# Patient Record
Sex: MALE | Race: WHITE | Employment: FULL TIME | URBAN - METROPOLITAN AREA
[De-identification: names, ages, dates, MRNs, and addresses within clinical notes are randomized per-mention and may not be internally consistent; named-entity substitution may affect disease eponyms.]

---

## 2018-01-21 ENCOUNTER — APPOINTMENT (OUTPATIENT)
Dept: GENERAL RADIOLOGY | Facility: CLINIC | Age: 48
End: 2018-01-21
Attending: EMERGENCY MEDICINE
Payer: COMMERCIAL

## 2018-01-21 ENCOUNTER — HOSPITAL ENCOUNTER (EMERGENCY)
Facility: CLINIC | Age: 48
Discharge: HOME | End: 2018-01-21
Attending: EMERGENCY MEDICINE | Admitting: EMERGENCY MEDICINE
Payer: COMMERCIAL

## 2018-01-21 VITALS
RESPIRATION RATE: 16 BRPM | TEMPERATURE: 98.8 F | OXYGEN SATURATION: 97 % | SYSTOLIC BLOOD PRESSURE: 132 MMHG | DIASTOLIC BLOOD PRESSURE: 84 MMHG | HEART RATE: 59 BPM

## 2018-01-21 DIAGNOSIS — J10.1 INFLUENZA A: ICD-10-CM

## 2018-01-21 LAB
FLUAV+FLUBV AG SPEC QL: NEGATIVE
FLUAV+FLUBV AG SPEC QL: POSITIVE
SPECIMEN SOURCE: ABNORMAL

## 2018-01-21 PROCEDURE — 99284 EMERGENCY DEPT VISIT MOD MDM: CPT | Mod: 25

## 2018-01-21 PROCEDURE — 71046 X-RAY EXAM CHEST 2 VIEWS: CPT

## 2018-01-21 PROCEDURE — 87804 INFLUENZA ASSAY W/OPTIC: CPT | Performed by: EMERGENCY MEDICINE

## 2018-01-21 RX ORDER — BENZONATATE 100 MG/1
100 CAPSULE ORAL 3 TIMES DAILY PRN
Qty: 20 CAPSULE | Refills: 0 | Status: SHIPPED | OUTPATIENT
Start: 2018-01-21

## 2018-01-21 ASSESSMENT — ENCOUNTER SYMPTOMS
DIARRHEA: 0
FEVER: 1
VOMITING: 0
COUGH: 1
HEADACHES: 1

## 2018-01-21 NOTE — ED NOTES
Patient was ill with flu symptoms last week, better yesterday but today woke up and started coughing. He feels the same as when he had a previous pneumonia. Chest hurts with coughing.

## 2018-01-21 NOTE — ED AVS SNAPSHOT
[facility] Emergency Department    [address] [facility] [address]    Phone:  [phone]    Fax:  [phone]                                       Jonny Spencer III   MRN: [id number]    Department:  [facility] Emergency Department   Date of Visit:  1/21/2018           Patient Information     Date Of Birth          1970        Your diagnoses for this visit were:     Influenza A        You were seen by Wellington Brown MD.      Follow-up Information     Follow up with Luis Soares MD.    Specialty:  Internal Medicine    Contact information:    [facility]  [address] [place] [address]  [phone]          Follow up with [facility] Emergency Department.    Specialty:  EMERGENCY MEDICINE    Why:  If symptoms worsen    Contact information:    [address] [place] [address]  [phone]        Discharge Instructions       Discharge Instructions  Influenza    You were diagnosed today with influenza or influenza like illness.  Influenza is a respiratory (breathing) illness caused by influenza A or B viruses.  Influenza causes five primary symptoms: fever, headache, muscle aches/fatigue/malaise, sore throat and cough.  These symptoms start one to four days after you have been around a person with this illness. Influenza is spread through sneezing and coughing and can live on surfaces for several days.  It is usually contagious for 5 days but in some cases up to 10 days and often affects several family members. If you have a family member who is less than 2 years old, older than 65 years old, pregnant or has a serious medical condition, they should be seen right away by a provider to decide if they should take preventative medications. Although influenza will make you feel very ill, most patients don t require any specific treatment. An antiviral medication might be prescribed for certain groups of  patients (older patients, younger patients, and those with certain chronic medical problems).    Generally, every Emergency Department visit should have a follow-up clinic visit with either a primary or a specialty clinic/provider. Please follow-up as instructed by your emergency provider today.    Return to the Emergency Department if:    You have trouble breathing.    You develop pain in your chest.    You have signs of being dehydrated, such as dizziness or unable to urinate (pee) at least three times daily.    You are confused or severely weak.    You cannot stop vomiting (throwing up) or you cannot drink enough fluids.    In children, you should seek help if the child has any of the above or if child:    Has blue or purplish skin color.    Is so irritable that he or she does not want to be held.    Does not have tears when crying (in infants) or does not urinate at least three times daily.    Does not wake up easily.    What can I do to help myself?    Rest.    Fluids -- Drink hydrating solutions such as Gatorade  or Pedialyte  as often as you can. If you are drinking enough, you should pass urine at least every eight hours.    Tylenol  (acetaminophen) and Advil  (ibuprofen) can relieve fever, headache, and muscle aches. Do not give aspirin to children under 18 years old.     Antiviral treatment -- Antiviral medicines do not make the flu symptoms go away immediately.  They have only been shown to make the symptoms go away 12 to 24 hours sooner than they would without treatment.       Antibiotics -- Antibiotics are NOT useful for treating viral illnesses such as influenza. Antibiotics should only be used if there is a bacterial complication of the flu such as bacterial pneumonia, ear infection, or sinusitis.    Because you were diagnosed with a flu like illness you are very contagious.  This means you cannot work, attend school or  for at least 24 hours or until you no longer have a fever.  If you were  given a prescription for medicine here today, be sure to read all of the information (including the package insert) that comes with your prescription.  This will include important information about the medicine, its side effects, and any warnings that you need to know about.  The pharmacist who fills the prescription can provide more information and answer questions you may have about the medicine.  If you have questions or concerns that the pharmacist cannot address, please call or return to the Emergency Department.   Remember that you can always come back to the Emergency Department if you are not able to see your regular provider in the amount of time listed above, if you get any new symptoms, or if there is anything that worries you.      24 Hour Appointment Hotline       To make an appointment at any [facility], call [phone] ([phone]). If you don't have a family doctor or clinic, we will help you find one. [facility] clinics are conveniently located to serve the needs of you and your family.             Review of your medicines      START taking        Dose / Directions Last dose taken    benzonatate 100 MG capsule   Commonly known as:  TESSALON   Dose:  100 mg   Quantity:  20 capsule        Take 1 capsule (100 mg) by mouth 3 times daily as needed for cough   Refills:  0                Prescriptions were sent or printed at these locations (1 Prescription)                   Other Prescriptions                Printed at Department/Unit printer (1 of 1)         benzonatate (TESSALON) 100 MG capsule                Procedures and tests performed during your visit     Chest XR,  PA & LAT    Influenza A/B antigen      Orders Needing Specimen Collection     None      Pending Results     Date and Time Order Name Status Description    1/21/2018 0946 Chest XR,  PA & LAT Preliminary             Pending Culture Results     No orders found from 1/19/2018 to 1/22/2018.            Pending Results Instructions      If you had any lab results that were not finalized at the time of your Discharge, you can call the ED Lab Result RN at [phone]. You will be contacted by this team for any positive Lab results or changes in treatment. The nurses are available 7 days a week from 10A to 6:30P.  You can leave a message 24 hours per day and they will return your call.        Test Results From Your Hospital Stay        1/21/2018 10:22 AM      Narrative     CHEST TWO VIEWS  1/21/2018 10:12 AM    HISTORY:  New cough, flu symptoms.     COMPARISON:  12/13/2013        Impression     IMPRESSION:  Negative chest. Lungs are clear.          1/21/2018 10:29 AM      Component Results     Component Value Ref Range & Units Status    Influenza A/B Agn Specimen Nasopharyngeal  Final    Influenza A Positive (A) NEG^Negative Final    Influenza B Negative NEG^Negative Final    Test results must be correlated with clinical data. If necessary, results   should be confirmed by a molecular assay or viral culture.                  Clinical Quality Measure: Blood Pressure Screening     Your blood pressure was checked while you were in the emergency department today. The last reading we obtained was  BP: 132/84 . Please read the guidelines below about what these numbers mean and what you should do about them.  If your systolic blood pressure (the top number) is less than 120 and your diastolic blood pressure (the bottom number) is less than 80, then your blood pressure is normal. There is nothing more that you need to do about it.  If your systolic blood pressure (the top number) is 120-139 or your diastolic blood pressure (the bottom number) is 80-89, your blood pressure may be higher than it should be. You should have your blood pressure rechecked within a year by a primary care provider.  If your systolic blood pressure (the top number) is 140 or greater or your diastolic blood pressure (the bottom number) is 90 or greater, you may have high blood  "pressure. High blood pressure is treatable, but if left untreated over time it can put you at risk for heart attack, stroke, or kidney failure. You should have your blood pressure rechecked by a primary care provider within the next 4 weeks.  If your provider in the emergency department today gave you specific instructions to follow-up with your doctor or provider even sooner than that, you should follow that instruction and not wait for up to 4 weeks for your follow-up visit.        Thank you for choosing [facility]       Thank you for choosing [facility] for your care. Our goal is always to provide you with excellent care. Hearing back from our patients is one way we can continue to improve our services. Please take a few minutes to complete the written survey that you may receive in the mail after you visit with us. Thank you!        [employer] Information     [facility] lets you send messages to your doctor, view your test results, renew your prescriptions, schedule appointments and more. To sign up, go to [email or web address] . Click on \"Log in\" on the left side of the screen, which will take you to the Welcome page. Then click on \"Sign up Now\" on the right side of the page.     You will be asked to enter the access code listed below, as well as some personal information. Please follow the directions to create your username and password.     Your access code is: [email or web address]  Expires: 2018 11:03 AM     Your access code will  in 90 days. If you need help or a new code, please call your [facility] clinic or [phone].        Care EveryWhere ID     This is your Care EveryWhere ID. This could be used by other organizations to access your [facility] medical records  [id number]        Equal Access to Services     CASEY CURTIS : lexi Coffey, claudio holguin. So [facility] [phone].    ATENCIÓN: Si habla español, tiene a mathias " disposición servicios gratuitos de asistencia lingüística. [place] al [phone].    We comply with applicable federal civil rights laws and [place] laws. We do not discriminate on the basis of race, color, national origin, age, disability, sex, sexual orientation, or gender identity.            After Visit Summary       This is your record. Keep this with you and show to your community pharmacist(s) and doctor(s) at your next visit.

## 2018-01-21 NOTE — ED AVS SNAPSHOT
[facility] Emergency Department    [address]    [facility] [address]    Phone:  [phone]    Fax:  [phone]                                       Jonny Spencer III   MRN: [id number]    Department:  [facility] Emergency Department   Date of Visit:  1/21/2018           After Visit Summary Signature Page     I have received my discharge instructions, and my questions have been answered. I have discussed any challenges I see with this plan with the nurse or doctor.    ..........................................................................................................................................  Patient/Patient Representative Signature      ..........................................................................................................................................  Patient Representative Print Name and Relationship to Patient    ..................................................               ................................................  Date                                            Time    ..........................................................................................................................................  Reviewed by Signature/Title    ...................................................              ..............................................  Date                                                            Time

## 2018-01-21 NOTE — ED PROVIDER NOTES
History     Chief Complaint:  Cough      HPI   Jonny Spencer III is a 47 year old male who presents with URI symptoms and cough. Patient reports that he was experiencing flu like symptoms including a raspy cough, body aches and fever since Wednesday, 4 days ago. Patient reports that he experienced those symptoms until Friday and then improved until this morning when he woke up. He reports that he woke up with a cough and chest discomfort with coughing. He adds that he has been nauseated intermittently with a headache, though he currently does not have one. He denies vomiting, diarrhea, and is otherwise healthy. Of note: Patient is concerned for a possible pneumonia since he has had one in the past and that he is leaving [facility] for [place].  He would prefer to be treated here if positive rather than trying to navigate the [facility] system. He did get his flu shot this year.      Allergies:  No known drug allergies.     Medications:    The patient is currently on no regular medications.     Past Medical History:    History reviewed.  No significant past medical history.      Past Surgical History:    History reviewed. No pertinent past surgical history.     Family History:    History reviewed. No pertinent family history.     Social History:  Marital Status:    Smoking status: Former Smoker  Alcohol use: Yes       Review of Systems   Constitutional: Positive for fever.   Respiratory: Positive for cough.    Gastrointestinal: Negative for diarrhea and vomiting.   Neurological: Positive for headaches.       Physical Exam   First Vitals:  BP: 132/84  Pulse: 59  Heart Rate: 59  Temp: 98.8  F (37.1  C)  Resp: 16  SpO2: 97 %      Physical Exam  General:                        Well-nourished, well appearing                        Speaking in full sentences             Intermittent dry cough  Eyes:                        Conjunctiva without injection or scleral icterus                        PERRL  ENT:                         Moist mucous membranes                        Posterior oropharynx clear without erythema or exudate                        Nares patent                        Pinnae normal  Neck:                        Full ROM                        No stiffness appreciated  Resp:                        Lungs CTAB                        No crackles, wheezing or audible rubs                        Good air movement  CV:                                        Normal rate, regular rhythm                        S1 and S2 present                        No murmur, gallop or rub  GI:                        BS present                        Abdomen soft without distention                        Non-tender to light and deep palpation                        No guarding or rebound tenderness  Skin:                        Warm, dry, well perfused                        No rashes or open wounds on exposed skin  MSK:                        Moves all extremities                        No focal deformities or swelling  Neuro:                        Alert                        Answers questions appropriately                        Moves all extremities equally                        Gait stable  Psych:                        Normal affect, normal mood         Emergency Department Course   Imaging:  Chest XR,  PA & LAT   Preliminary Result   IMPRESSION:  Negative chest. Lungs are clear.            Laboratory:  Influenza A/B Antigen: Positive for A    Emergency Department Course:  Past medical records, nursing notes, and vitals reviewed.  0957: I performed an exam of the patient and obtained history, as documented above.     The patient was sent for a chest XR while in the emergency department, findings above.     Findings and plan explained to the Patient. Patient discharged home with instructions regarding supportive care, medications, and reasons to return. The importance of close follow-up was reviewed.      Impression & Plan     Medical Decision Making:  Jonny Spencer III is a 47 year old male who presents to the ER for cough.  Vital signs on presentation reveal mildly elevated BP, though are otherwise unremarkable.  Differential diagnosis is broad, including but not limited to influenza, influenza-like-illness, serious bacterial infection (bacteremia, meningitis, UTI/pyelopnephritis, strep pharyngitis, pneumonia), deep space neck infection, among others.     At this point, patient's signs and symptoms are consistent with influenza.  Testing as above, positive for influenza A.  Patient outside the treatment window for influenza, and is without other complicating risk factors.  Symptomatic treatment is therefore indicated.  At present, bacterial meningitis seems very unlikely.  Patient is non-toxic in appearance, interacting with this writer appropriately, with a supple neck exam, no current headache and normal vital signs.  Clinically, the risks of LP are felt to outweigh the benefits at this time. They are at risk for pneumonia but no signs of this are detected on today's visit.  CXR is negative for acute infiltrate and pulmonary exam is clear.  Will hold on antibiotics at this time.  No other signs or symptoms consistent with UTI/pyelonephritis, strep pharyngitis, or deep space neck infection are detected at this time.  Will provide tessalon for cough suppression.  Discussed infective nature of symptoms and advised against travel until fever free for 24 hours off tylenol/ibuprofen.  Close followup of primary care physician is indicated and return to the ED for high fevers > 103 for more than 48 hours more, increasing productive cough, shortness of breath, or confusion.   Critical Care time:  none    Diagnosis:    ICD-10-CM    1. Influenza A J10.1        Disposition:  discharged to home    Discharge Medications:  New Prescriptions    BENZONATATE (TESSALON) 100 MG CAPSULE    Take 1 capsule (100 mg) by mouth 3 times daily as needed for  cough     I, Sharee Llanos, am serving as a scribe at 9:57 AM on 1/21/2018 to document services personally performed by Wellington Brown MD based on my observations and the provider's statements to me.   [facility] EMERGENCY DEPARTMENT       Wellington Brown MD  01/21/18 1951

## 2018-04-27 ENCOUNTER — APPOINTMENT (OUTPATIENT)
Dept: CT IMAGING | Facility: CLINIC | Age: 48
End: 2018-04-27
Attending: NURSE PRACTITIONER
Payer: COMMERCIAL

## 2018-04-27 ENCOUNTER — HOSPITAL ENCOUNTER (EMERGENCY)
Facility: CLINIC | Age: 48
Discharge: HOME | End: 2018-04-27
Attending: NURSE PRACTITIONER | Admitting: NURSE PRACTITIONER
Payer: COMMERCIAL

## 2018-04-27 VITALS
HEIGHT: 74 IN | DIASTOLIC BLOOD PRESSURE: 83 MMHG | HEART RATE: 82 BPM | WEIGHT: 215 LBS | OXYGEN SATURATION: 99 % | TEMPERATURE: 97.2 F | SYSTOLIC BLOOD PRESSURE: 143 MMHG | BODY MASS INDEX: 27.59 KG/M2 | RESPIRATION RATE: 20 BRPM

## 2018-04-27 DIAGNOSIS — N21.9 CALCULUS OF LOWER URINARY TRACT: ICD-10-CM

## 2018-04-27 LAB
ALBUMIN UR-MCNC: NEGATIVE MG/DL
APPEARANCE UR: CLEAR
BILIRUB UR QL STRIP: NEGATIVE
COLOR UR AUTO: YELLOW
GLUCOSE UR STRIP-MCNC: NEGATIVE MG/DL
HGB UR QL STRIP: ABNORMAL
KETONES UR STRIP-MCNC: NEGATIVE MG/DL
LEUKOCYTE ESTERASE UR QL STRIP: NEGATIVE
MUCOUS THREADS #/AREA URNS LPF: PRESENT /LPF
NITRATE UR QL: NEGATIVE
PH UR STRIP: 5 PH (ref 5–7)
RBC #/AREA URNS AUTO: 12 /HPF (ref 0–2)
SOURCE: ABNORMAL
SP GR UR STRIP: 1.03 (ref 1–1.03)
SQUAMOUS #/AREA URNS AUTO: <1 /HPF (ref 0–1)
UROBILINOGEN UR STRIP-MCNC: 0 MG/DL (ref 0–2)
WBC #/AREA URNS AUTO: <1 /HPF (ref 0–5)

## 2018-04-27 PROCEDURE — 81001 URINALYSIS AUTO W/SCOPE: CPT | Performed by: EMERGENCY MEDICINE

## 2018-04-27 PROCEDURE — 99285 EMERGENCY DEPT VISIT HI MDM: CPT | Mod: 25

## 2018-04-27 PROCEDURE — 25000131 ZZH RX MED GY IP 250 OP 636 PS 637: Performed by: NURSE PRACTITIONER

## 2018-04-27 PROCEDURE — 25000128 H RX IP 250 OP 636

## 2018-04-27 PROCEDURE — 25000128 H RX IP 250 OP 636: Performed by: NURSE PRACTITIONER

## 2018-04-27 PROCEDURE — 74176 CT ABD & PELVIS W/O CONTRAST: CPT

## 2018-04-27 PROCEDURE — 96361 HYDRATE IV INFUSION ADD-ON: CPT

## 2018-04-27 PROCEDURE — 96374 THER/PROPH/DIAG INJ IV PUSH: CPT

## 2018-04-27 PROCEDURE — 96375 TX/PRO/DX INJ NEW DRUG ADDON: CPT

## 2018-04-27 RX ORDER — DIMENHYDRINATE 50 MG
50 TABLET ORAL ONCE
Status: COMPLETED | OUTPATIENT
Start: 2018-04-27 | End: 2018-04-27

## 2018-04-27 RX ORDER — ONDANSETRON 2 MG/ML
4 INJECTION INTRAMUSCULAR; INTRAVENOUS ONCE
Status: COMPLETED | OUTPATIENT
Start: 2018-04-27 | End: 2018-04-27

## 2018-04-27 RX ORDER — METOCLOPRAMIDE 10 MG/1
10 TABLET ORAL 3 TIMES DAILY PRN
Qty: 20 TABLET | Refills: 0 | Status: SHIPPED | OUTPATIENT
Start: 2018-04-27

## 2018-04-27 RX ORDER — ONDANSETRON 2 MG/ML
INJECTION INTRAMUSCULAR; INTRAVENOUS
Status: COMPLETED
Start: 2018-04-27 | End: 2018-04-27

## 2018-04-27 RX ORDER — HYDROCODONE BITARTRATE AND ACETAMINOPHEN 5; 325 MG/1; MG/1
1-2 TABLET ORAL EVERY 6 HOURS PRN
Qty: 15 TABLET | Refills: 0 | Status: SHIPPED | OUTPATIENT
Start: 2018-04-27

## 2018-04-27 RX ORDER — KETOROLAC TROMETHAMINE 15 MG/ML
15 INJECTION, SOLUTION INTRAMUSCULAR; INTRAVENOUS ONCE
Status: COMPLETED | OUTPATIENT
Start: 2018-04-27 | End: 2018-04-27

## 2018-04-27 RX ADMIN — ONDANSETRON 4 MG: 2 INJECTION INTRAMUSCULAR; INTRAVENOUS at 16:11

## 2018-04-27 RX ADMIN — KETOROLAC TROMETHAMINE 15 MG: 15 INJECTION, SOLUTION INTRAMUSCULAR; INTRAVENOUS at 16:10

## 2018-04-27 RX ADMIN — DIMENHYDRINATE 50 MG: 50 TABLET ORAL at 16:50

## 2018-04-27 RX ADMIN — HYDROMORPHONE HYDROCHLORIDE 1 MG: 1 INJECTION, SOLUTION INTRAMUSCULAR; INTRAVENOUS; SUBCUTANEOUS at 16:46

## 2018-04-27 RX ADMIN — SODIUM CHLORIDE 1000 ML: 9 INJECTION, SOLUTION INTRAVENOUS at 16:13

## 2018-04-27 ASSESSMENT — ENCOUNTER SYMPTOMS
FEVER: 0
NAUSEA: 1
RESPIRATORY NEGATIVE: 1
FLANK PAIN: 1
CHILLS: 0
ABDOMINAL PAIN: 1
VOMITING: 0
DYSURIA: 1

## 2018-04-27 NOTE — ED PROVIDER NOTES
"  History     Chief Complaint:  Flank Pain    HPI   Jonny Spencer III is a 47 year old male who presents with patient playing golf when developed sudden onset right-sided flank pain radiating down to his testicle.  Very uncomfortable pain causes nausea no vomiting.  He does not note any alberto hematuria but does note dysuria and feeling the need to urinate.  No history of stones.  No fever.  No injuries.    Allergies:  No known drug allergies      Medications:    The patient is not currently taking any prescribed medications.     Past Medical History:    Unspecified hernia    Past Surgical History:    Hernia Repair    Family History:    History reviewed. No pertinent family history.      Social History:  Presents alone   Tobacco use: Former smoker  Alcohol use: Yes  PCP: ERICKA PRICE    Marital Status:        Review of Systems   Constitutional: Negative for chills and fever.   Respiratory: Negative.    Gastrointestinal: Positive for abdominal pain and nausea. Negative for vomiting.   Genitourinary: Positive for dysuria and flank pain.   All other systems reviewed and are negative.      Physical Exam     Patient Vitals for the past 24 hrs:   BP Temp Temp src Pulse Resp SpO2 Height Weight   04/27/18 1650 147/80 - - 82 20 100 % - -   04/27/18 1613 (!) 156/116 - - - - - - -   04/27/18 1602 - 97.2  F (36.2  C) Temporal 108 24 97 % 1.88 m (6' 2\") 97.5 kg (215 lb)      Physical Exam  General: Alert, Moderate discomfort, well kept  Eyes: PERRL, conjunctivae pink no scleral icterus or conjunctival injection  ENT:   Moist mucus membranes, posterior oropharynx clear without erythema or exudates, No lymphadenopathy, Normal voice  Resp:  Lungs clear to auscultation bilaterally, no crackles/rubs/wheezes. Good air movement  CV:  Normal rate and rhythm, no murmurs/rubs/gallops  GI:  Abdomen soft and non-distended.  Normoactive BS.  No tenderness, guarding or rebound, No masses  Skin:  Warm, dry.  No rashes or " petechiae  Musculoskeletal: No peripheral edema or calf tenderness, Normal gross ROM   Neuro: Alert and oriented to person/place/time, normal sensation  Psychiatric: Normal affect, cooperative, good eye contact      Emergency Department Course   Imaging:  Radiographic findings were communicated with the patient who voiced understanding of the findings.  Abd/pelvis CT no contrast - Stone Protocol  IMPRESSION: 4 mm stone at the right ureterovesicular junction with mild proximal hydronephrosis and stranding. Tiny nonobstructing stone on the left.    Laboratory:  UA: Blood Small (A), RBC/HPF 12 (H), Mucous Urine Present (A), o/w Negative   Recent Results (from the past 8 hour(s))   UA with Microscopic    Collection Time: 04/27/18  4:10 PM   Result Value Ref Range    Color Urine Yellow     Appearance Urine Clear     Glucose Urine Negative NEG^Negative mg/dL    Bilirubin Urine Negative NEG^Negative    Ketones Urine Negative NEG^Negative mg/dL    Specific Gravity Urine 1.028 1.003 - 1.035    Blood Urine Small (A) NEG^Negative    pH Urine 5.0 5.0 - 7.0 pH    Protein Albumin Urine Negative NEG^Negative mg/dL    Urobilinogen mg/dL 0.0 0.0 - 2.0 mg/dL    Nitrite Urine Negative NEG^Negative    Leukocyte Esterase Urine Negative NEG^Negative    Source Midstream Urine     WBC Urine <1 0 - 5 /HPF    RBC Urine 12 (H) 0 - 2 /HPF    Squamous Epithelial /HPF Urine <1 0 - 1 /HPF    Mucous Urine Present (A) NEG^Negative /LPF         Interventions:  1610: Toradol 15mg IV injection   1611: Zofran 4mg IV injection    1613: NS 1L IV Bolus   1646: Dilaudid 1 mg IV injection   1650: Dramamine 50 mg PO    The patient's symptoms were improved with parenteral narcotics.    Emergency Department Course:  Past medical records, nursing notes, and vitals reviewed.  I performed an exam of the patient and obtained history, as documented above.     1715: I rechecked the patient. Findings and plan explained to the Patient. Patient discharged home with  instructions regarding supportive care, medications, and reasons to return. The importance of close follow-up was reviewed.      I personally reviewed the laboratory results with the Patient and answered all related questions prior to discharge.      Impression & Plan    Medical Decision Making:  Jonny Spencer III is a 47 year old male who presented with unilateral flank & abdominal pain consistent with renal colic. CT confirms a 4 mm ureteral stone at the Right UVJ.  No medical history no further testing indicated.  CT and lab workup show no other alternative etiology that could be causing his symptoms (e.g., AAA, appendicitis, pyelonephritis). There is no fever or convincing evidence of a urinary tract infection. On recheck, his pain is controlled with interventions in the ED and he is tolerating POs. I will prescribe supportive medications and Dramamine to facilitate stone passage. I have advised him to return for uncontrolled pain, vomiting, fever, or any other concerning symptoms. I also advised to strain his urine to look for a stone and submit it to his primary doctor for lab analysis.  Finally, I have advised follow up with urology or primary care within 3-5 days.        Diagnosis:    ICD-10-CM    1. Calculus of lower urinary tract N21.9        Disposition:  Discharged to home with plan as outlined.    Discharge Medications:  New Prescriptions    HYDROCODONE-ACETAMINOPHEN (NORCO) 5-325 MG PER TABLET    Take 1-2 tablets by mouth every 6 hours as needed for pain maximum 10 tablet(s) per day    METOCLOPRAMIDE (REGLAN) 10 MG TABLET    Take 1 tablet (10 mg) by mouth 3 times daily as needed (Nausea or Vomiting)         Rashid Zuleta  4/27/2018   [facility] EMERGENCY DEPARTMENT  I, Rashid Zuleta, am serving as a scribe at 5:15 PM on 4/27/2018 to document services personally performed by Theodore Franz APRN CNP based on my observations and the provider's statements to me.       Theodore Franz  MIRANDA Haynes CNP  04/27/18 1730

## 2018-04-27 NOTE — ED TRIAGE NOTES
Sudden onset of right flank pain that radiates into front and down into right testicle.  Feels like he has to urinate.  Writhing in triage.      ABCs intact.  Alert and oriented x 3.

## 2018-04-27 NOTE — ED AVS SNAPSHOT
[facility] Emergency Department    [address]    [facility] [address]    Phone:  [phone]    Fax:  [phone]                                       Jonny Spencer III   MRN: [id number]    Department:  [facility] Emergency Department   Date of Visit:  4/27/2018           Patient Information     Date Of Birth          1970        Your diagnoses for this visit were:     Calculus of lower urinary tract        You were seen by Theodore Franz, MIRANDA CNP.      Follow-up Information     Follow up with Luis Soares MD In 3 days.    Specialty:  Internal Medicine    Why:  on Monday to schedule appointment for follow up    Contact information:    [facility]  [address] [place] [address] [phone]          Follow up with [facility]. Call in 3 days.    Specialty:  Urology    Why:  to schedule appointment for follow up    Contact information:    [address] [place] [facility] [address]  [facility] [address] [phone]        Discharge Instructions       Take Dramamine 50 mg 3x/day, Ibuprofen 600 mg every 6 hrs, and use Pain medication and anti-nausea medication as needed. Strain Urine        Discharge References/Attachments     KIDNEY STONE (URINE) (ENGLISH)    KIDNEY STONE W/ COLIC (ENGLISH)      24 Hour Appointment Hotline       To make an appointment at any [facility], call [phone] ([phone]). If you don't have a family doctor or clinic, we will help you find one. [facility] are conveniently located to serve the needs of you and your family.             Review of your medicines      START taking        Dose / Directions Last dose taken    HYDROcodone-acetaminophen 5-325 MG per tablet   Commonly known as:  NORCO   Dose:  1-2 tablet   Quantity:  15 tablet        Take 1-2 tablets by mouth every 6 hours as needed for pain maximum 10 tablet(s) per day   Refills:  0        metoclopramide 10 MG tablet   Commonly known  as:  REGLAN   Dose:  10 mg   Quantity:  20 tablet        Take 1 tablet (10 mg) by mouth 3 times daily as needed (Nausea or Vomiting)   Refills:  0          Our records show that you are taking the medicines listed below. If these are incorrect, please call your family doctor or clinic.        Dose / Directions Last dose taken    benzonatate 100 MG capsule   Commonly known as:  TESSALON   Dose:  100 mg   Quantity:  20 capsule        Take 1 capsule (100 mg) by mouth 3 times daily as needed for cough   Refills:  0                Information about OPIOIDS     PRESCRIPTION OPIOIDS: WHAT YOU NEED TO KNOW   You have a prescription for an opioid (narcotic) pain medicine. Opioids can cause addiction. If you have a history of chemical dependency of any type, you are at a higher risk of becoming addicted to opioids. Only take this medicine after all other options have been tried. Take it for as short a time and as few doses as possible.     Do not:    Drive. If you drive while taking these medicines, you could be arrested for driving under the influence (DUI).    Operate heavy machinery    Do any other dangerous activities while taking these medicines.     Drink any alcohol while taking these medicines.      Take with any other medicines that contain acetaminophen. Read all labels carefully. Look for the word  acetaminophen  or  Tylenol.  Ask your pharmacist if you have questions or are unsure.    Store your pills in a secure place, locked if possible. We will not replace any lost or stolen medicine. If you don t finish your medicine, please throw away (dispose) as directed by your pharmacist. The [place] [employer] has more information about safe disposal: https://www.pca.state.[place]/living-green/managing-unwanted-medications    All opioids tend to cause constipation. Drink plenty of water and eat foods that have a lot of fiber, such as fruits, vegetables, prune juice, apple juice and high-fiber cereal. Take a  laxative (Miralax, milk of magnesia, Colace, Senna) if you don t move your bowels at least every other day.         Prescriptions were sent or printed at these locations (2 Prescriptions)                   Other Prescriptions                Printed at Department/Unit printer (2 of 2)         HYDROcodone-acetaminophen (NORCO) 5-325 MG per tablet               metoclopramide (REGLAN) 10 MG tablet                Procedures and tests performed during your visit     Abd/pelvis CT no contrast - Stone Protocol    UA with Microscopic      Orders Needing Specimen Collection     None      Pending Results     No orders found from 4/25/2018 to 4/28/2018.            Pending Culture Results     No orders found from 4/25/2018 to 4/28/2018.            Pending Results Instructions     If you had any lab results that were not finalized at the time of your Discharge, you can call the ED Lab Result RN at [phone]. You will be contacted by this team for any positive Lab results or changes in treatment. The nurses are available 7 days a week from 10A to 6:30P.  You can leave a message 24 hours per day and they will return your call.        Test Results From Your Hospital Stay        4/27/2018  4:40 PM      Component Results     Component Value Ref Range & Units Status    Color Urine Yellow  Final    Appearance Urine Clear  Final    Glucose Urine Negative NEG^Negative mg/dL Final    Bilirubin Urine Negative NEG^Negative Final    Ketones Urine Negative NEG^Negative mg/dL Final    Specific Gravity Urine 1.028 1.003 - 1.035 Final    Blood Urine Small (A) NEG^Negative Final    pH Urine 5.0 5.0 - 7.0 pH Final    Protein Albumin Urine Negative NEG^Negative mg/dL Final    Urobilinogen mg/dL 0.0 0.0 - 2.0 mg/dL Final    Nitrite Urine Negative NEG^Negative Final    Leukocyte Esterase Urine Negative NEG^Negative Final    Source Midstream Urine  Final    WBC Urine <1 0 - 5 /HPF Final    RBC Urine 12 (H) 0 - 2 /HPF Final    Squamous Epithelial  /HPF Urine <1 0 - 1 /HPF Final    Mucous Urine Present (A) NEG^Negative /LPF Final         4/27/2018  5:18 PM      Narrative     CT ABDOMEN AND PELVIS WITHOUT CONTRAST 4/27/2018 5:02 PM     HISTORY: Right flank pain.     COMPARISON: None.    TECHNIQUE: Axial CT images of the abdomen and pelvis from the  diaphragm to the symphysis pubis were acquired without IV contrast.  Radiation dose for this scan was reduced using automated exposure  control, adjustment of the mA and/or kV according to patient size, or  iterative reconstruction technique.    FINDINGS: 4 mm stone at the right ureterovesicular junction with mild  proximal hydronephrosis. There is mild right perinephric fat  stranding. Kidneys are normal in size and configuration.  1-2 mm  nonobstructing stone in the inferior pole of the left kidney. No free  air or free fluid. Normal appendix. Normal caliber aorta. Unremarkable  gallbladder. Low-attenuation subcentimeter liver lesion(s) compatible  with benign cysts or other benign lesions. No specific evaluation or  follow-up is recommended in a low risk patient.  No splenomegaly.  No  definite adrenal nodules.  Pancreas grossly unremarkable. The  remainder of the visualized abdomen is unremarkable on this  noncontrast scan. Survey of the visualized bony structures  demonstrates no destructive bony lesions. The visualized lung bases  are unremarkable.         Impression     IMPRESSION: 4 mm stone at the right ureterovesicular junction with  mild proximal hydronephrosis and stranding. Tiny nonobstructing stone  on the left.    ERICK LEES MD                Clinical Quality Measure: Blood Pressure Screening     Your blood pressure was checked while you were in the emergency department today. The last reading we obtained was  BP: 147/80 . Please read the guidelines below about what these numbers mean and what you should do about them.  If your systolic blood pressure (the top number) is less than 120 and your  "diastolic blood pressure (the bottom number) is less than 80, then your blood pressure is normal. There is nothing more that you need to do about it.  If your systolic blood pressure (the top number) is 120-139 or your diastolic blood pressure (the bottom number) is 80-89, your blood pressure may be higher than it should be. You should have your blood pressure rechecked within a year by a primary care provider.  If your systolic blood pressure (the top number) is 140 or greater or your diastolic blood pressure (the bottom number) is 90 or greater, you may have high blood pressure. High blood pressure is treatable, but if left untreated over time it can put you at risk for heart attack, stroke, or kidney failure. You should have your blood pressure rechecked by a primary care provider within the next 4 weeks.  If your provider in the emergency department today gave you specific instructions to follow-up with your doctor or provider even sooner than that, you should follow that instruction and not wait for up to 4 weeks for your follow-up visit.        Thank you for choosing [facility]       Thank you for choosing [facility] for your care. Our goal is always to provide you with excellent care. Hearing back from our patients is one way we can continue to improve our services. Please take a few minutes to complete the written survey that you may receive in the mail after you visit with us. Thank you!        [employer] Information     [employer] lets you send messages to your doctor, view your test results, renew your prescriptions, schedule appointments and more. To sign up, go to [email or web address] . Click on \"Log in\" on the left side of the screen, which will take you to the Welcome page. Then click on \"Sign up Now\" on the right side of the page.     You will be asked to enter the access code listed below, as well as some personal information. Please follow the directions to create your username and password.     Your " access code is: [email or web address]  Expires: 2018  5:41 PM     Your access code will  in 90 days. If you need help or a new code, please call your [facility] or [phone].        Care EveryWhere ID     This is your Care EveryWhere ID. This could be used by other organizations to access your [facility] medical records  [id number]        Equal Access to Services     [facility] : Hadii nelly noelo Sojulio, waaxda lubrianadaha, qaybta kaalmada david, claudio winters . So [facility] [phone].    ATENCIÓN: Si habla tyreeañol, tiene a mathias disposición servicios gratuitos de asistencia lingüística. Llame al [phone].    We comply with applicable federal civil rights laws and [place] laws. We do not discriminate on the basis of race, color, national origin, age, disability, sex, sexual orientation, or gender identity.            After Visit Summary       This is your record. Keep this with you and show to your community pharmacist(s) and doctor(s) at your next visit.

## 2018-04-27 NOTE — DISCHARGE INSTRUCTIONS
Take Dramamine 50 mg 3x/day, Ibuprofen 600 mg every 6 hrs, and use Pain medication and anti-nausea medication as needed. Strain Urine

## 2018-04-27 NOTE — ED AVS SNAPSHOT
[facility] Emergency Department    [address]    [facility] [address]    Phone:  [phone]    Fax:  [phone]                                       Jonny Spencer III   MRN: [id number]    Department:  [facility] Emergency Department   Date of Visit:  4/27/2018           After Visit Summary Signature Page     I have received my discharge instructions, and my questions have been answered. I have discussed any challenges I see with this plan with the nurse or doctor.    ..........................................................................................................................................  Patient/Patient Representative Signature      ..........................................................................................................................................  Patient Representative Print Name and Relationship to Patient    ..................................................               ................................................  Date                                            Time    ..........................................................................................................................................  Reviewed by Signature/Title    ...................................................              ..............................................  Date                                                            Time

## 2021-11-26 ENCOUNTER — APPOINTMENT (OUTPATIENT)
Dept: GENERAL RADIOLOGY | Facility: CLINIC | Age: 51
End: 2021-11-26
Attending: EMERGENCY MEDICINE
Payer: COMMERCIAL

## 2021-11-26 ENCOUNTER — HOSPITAL ENCOUNTER (EMERGENCY)
Facility: CLINIC | Age: 51
Discharge: HOME | End: 2021-11-26
Attending: EMERGENCY MEDICINE | Admitting: EMERGENCY MEDICINE
Payer: COMMERCIAL

## 2021-11-26 VITALS
TEMPERATURE: 97.3 F | HEART RATE: 72 BPM | RESPIRATION RATE: 20 BRPM | OXYGEN SATURATION: 98 % | SYSTOLIC BLOOD PRESSURE: 128 MMHG | DIASTOLIC BLOOD PRESSURE: 83 MMHG | BODY MASS INDEX: 28.23 KG/M2 | WEIGHT: 220 LBS | HEIGHT: 74 IN

## 2021-11-26 DIAGNOSIS — S43.014A ANTERIOR DISLOCATION OF RIGHT SHOULDER, INITIAL ENCOUNTER: ICD-10-CM

## 2021-11-26 PROCEDURE — 73030 X-RAY EXAM OF SHOULDER: CPT | Mod: RT

## 2021-11-26 PROCEDURE — 250N000011 HC RX IP 250 OP 636: Performed by: EMERGENCY MEDICINE

## 2021-11-26 PROCEDURE — 96374 THER/PROPH/DIAG INJ IV PUSH: CPT | Mod: 59

## 2021-11-26 PROCEDURE — 23650 CLTX SHO DSLC W/MNPJ WO ANES: CPT | Mod: RT

## 2021-11-26 PROCEDURE — 99285 EMERGENCY DEPT VISIT HI MDM: CPT | Mod: 25

## 2021-11-26 RX ORDER — FENTANYL CITRATE 50 UG/ML
100 INJECTION, SOLUTION INTRAMUSCULAR; INTRAVENOUS ONCE
Status: COMPLETED | OUTPATIENT
Start: 2021-11-26 | End: 2021-11-26

## 2021-11-26 RX ADMIN — FENTANYL CITRATE 100 MCG: 50 INJECTION, SOLUTION INTRAMUSCULAR; INTRAVENOUS at 10:39

## 2021-11-26 ASSESSMENT — ENCOUNTER SYMPTOMS
SHORTNESS OF BREATH: 0
NECK PAIN: 1
COUGH: 0
NUMBNESS: 1
FEVER: 0

## 2021-11-26 ASSESSMENT — MIFFLIN-ST. JEOR: SCORE: 1932.66

## 2021-11-26 NOTE — ED PROVIDER NOTES
"  History   Chief Complaint:  Shoulder Injury       The history is provided by the patient.      Jonny Spencer III is a right handed 51 year old male who presents with right shoulder injury. Patient was playing pickle ball this morning when he fell into a wall. No reported head trauma. At bedside he endorses numbness and tingling. Pain shoots up his shoulder and through the bottom of his neck. Denies chest pain, shortness of breath, fever, or cough.       Review of Systems   Constitutional: Negative for fever.   Respiratory: Negative for cough and shortness of breath.    Cardiovascular: Negative for chest pain.   Musculoskeletal: Positive for neck pain.        Right shoulder pain   Neurological: Positive for numbness.   All other systems reviewed and are negative.      Allergies:  Penicillins    Medications:  The patient is currently on no regular medications.    Past Medical History:     Calculus of lower urinary tract  Influenza A  Acute recurrent maxillary sinusitis    Past Surgical History:    Hernia repair    Social History:  Patient unaccompanied  PCP: Luis Soares  Alcohol: modestly   Tobacco: smokeless    Physical Exam     Patient Vitals for the past 24 hrs:   BP Temp Temp src Pulse Resp SpO2 Height Weight   11/26/21 1100 -- -- -- -- -- 95 % -- --   11/26/21 1045 -- -- -- -- -- 96 % -- --   11/26/21 1016 (!) 173/93 97.3  F (36.3  C) Tympanic 66 20 97 % 1.88 m (6' 2\") 99.8 kg (220 lb)       Physical Exam  Constitutional: Well developed, uncomfortable, nontox appearance  Head: Atraumatic.   Mouth/Throat: Oropharynx is clear and moist.   Neck:  no stridor  Eyes: no scleral icterus  Cardiovascular: RRR, 2+ right radial pulse  Pulmonary/Chest: nml resp effort, Clear BS bilat  Ext: Warm, well perfused, no edema              RUE: Obvious deformity at shoulder joint with likely anterior dislocation, limited range of motion, distal CMS intact, no bony crepitance  Neurological: A&O, symmetric facies, moves ext " x4  Skin: Skin is warm and dry.   Psychiatric: Behavior is normal. Thought content normal.   Nursing note and vitals reviewed.    Emergency Department Course   Imaging:  XR Shoulder Right G/E 3 Views   Preliminary Result   IMPRESSION: There is a small amount of calcification anterior and   inferior to the glenoid, likely related to a displaced bony Bankart   fracture. There is also probably a small Hill-Sachs fracture.   Alignment is anatomic. No current subluxation or dislocation.   Otherwise negative.        Report per radiology    [facility]    -Dislocation - Upper Extremity    Date/Time: 11/26/2021 10:51 AM  Performed by: Franki Bhakta MD  Authorized by: Franki Bhakta MD     Risks, benefits and alternatives discussed.      LOCATION     Location:  Shoulder    Shoulder location:  R shoulder    Shoulder dislocation type: anterior      Chronicity:  New    PRE PROCEDURE ASSESSMENT      Pre-procedure imaging:  None    Distal perfusion: normal      ANESTHESIA (see MAR for exact dosages)      Anesthesia method:  None    PROCEDURE DETAILS      Manipulation performed: yes      Shoulder reduction method:  Direct traction and external rotation (Fares method)    Reduction successful: yes      Reduction confirmed with imaging: yes      Immobilization:  Sling and strapping    Supplies used:  Sling    POST PROCEDURE DETAILS     Neurological function: normal      Distal perfusion: normal      Range of motion: improved        PROCEDURE    Patient Tolerance:  Patient tolerated the procedure well with no immediate complications        Emergency Department Course:  Reviewed:  I reviewed nursing notes, vitals, past medical history and Care Everywhere    Assessments/Consults:  ED Course as of 11/26/21 1210   Fri Nov 26, 2021   1028 Obtained history and examined the patient as noted above   1152 Rechecked the patinet     Interventions:  1039 Fentanyl injection 100 mcg, IV    Disposition:  The  patient was discharged to home.     Impression & Plan   Medical Decision Makin year old male presenting w/ R shoulder dislocation     DDx includes fracture, contusion, sprain, tendinitis, dislocation, extremity pain NOS.  Doubt vascular etiology, septic arthritis given history and physical exam.  No evidence of compartment syndrome on exam.  CMS is intact distally in the extremity.   No other signs or symptoms of traumatic injury other than the patient's shoulder.  Reduction performed prior to imaging given presentation is consistent with anterior right shoulder dislocation.  Postreduction imaging sig for possible Bankart and Hill-Sachs fractures in anatomic alignment indicating successful reduction.  Interventions as noted above with improvement in symptoms.  CMS intact status post reduction.  Patient was placed in a sling and given recommendations regarding follow-up with orthopedics.  At this time I feel the patient is safe for discharge.  [facility] instructions given for home Recommendations given regarding follow up with Orthopedics and return to the emergency department as needed for new or worsening symptoms.  Pt counseled on all results, diagnosis and disposition.  They are understanding and agreeable to plan. Patient discharged in stable condition.       Diagnosis:    ICD-10-CM    1. Anterior dislocation of right shoulder, initial encounter  S43.014A        Scribe Disclosure:  Francis SANTANA, am serving as a scribe at 10:27 AM on 2021 to document services personally performed by Franki Bhakta MD based on my observations and the provider's statements to me.            Franki Bhakta MD  21 1114

## 2021-11-26 NOTE — ED TRIAGE NOTES
A&O x4, ABCs intact. Pt presents with concern for right shoulder dislocation. Pt states that he was playing pickle ball and fell into the wall.

## 2021-11-26 NOTE — DISCHARGE INSTRUCTIONS
1. -Take acetaminophen 500 to 1000 mg by mouth every 4 to 6 hours as needed for pain or fever.  Do not take more than 4000 mg in 24 hours.  Do not take within 6 hours of another acetaminophen containing medication such as norco (vicodin) or percocet.  - Take ibuprofen 600 to 800 mg by mouth every 6 to 8 hours as needed for pain or fever  2. Use ice as needed for swelling and pain.  3.  Wear sling until follow-up with [facility] orthopedics in 1 week  4. Follow-up with [facility] orthopedics in 1 week  5. You may return to the ED as needed for new or worsening symptoms such as reinjury, severe and uncontrollable pain, focal weakness, severe numbness and tingling, any other concerning symptoms.

## 2021-12-08 ENCOUNTER — APPOINTMENT (OUTPATIENT)
Dept: GENERAL RADIOLOGY | Facility: CLINIC | Age: 51
End: 2021-12-08
Attending: EMERGENCY MEDICINE
Payer: COMMERCIAL

## 2021-12-08 ENCOUNTER — HOSPITAL ENCOUNTER (EMERGENCY)
Facility: CLINIC | Age: 51
Discharge: HOME | End: 2021-12-08
Attending: EMERGENCY MEDICINE | Admitting: EMERGENCY MEDICINE
Payer: COMMERCIAL

## 2021-12-08 VITALS
OXYGEN SATURATION: 98 % | HEART RATE: 73 BPM | DIASTOLIC BLOOD PRESSURE: 100 MMHG | RESPIRATION RATE: 18 BRPM | SYSTOLIC BLOOD PRESSURE: 164 MMHG | TEMPERATURE: 98.8 F

## 2021-12-08 DIAGNOSIS — S43.004A SHOULDER DISLOCATION, RIGHT, INITIAL ENCOUNTER: ICD-10-CM

## 2021-12-08 PROCEDURE — 23675 CLTX SHO DISLC NECK FX MNPJ: CPT | Mod: RT

## 2021-12-08 PROCEDURE — 250N000013 HC RX MED GY IP 250 OP 250 PS 637: Performed by: EMERGENCY MEDICINE

## 2021-12-08 PROCEDURE — 96374 THER/PROPH/DIAG INJ IV PUSH: CPT

## 2021-12-08 PROCEDURE — 250N000011 HC RX IP 250 OP 636

## 2021-12-08 PROCEDURE — 73020 X-RAY EXAM OF SHOULDER: CPT | Mod: RT

## 2021-12-08 PROCEDURE — 250N000011 HC RX IP 250 OP 636: Performed by: EMERGENCY MEDICINE

## 2021-12-08 PROCEDURE — 99285 EMERGENCY DEPT VISIT HI MDM: CPT | Mod: 25

## 2021-12-08 PROCEDURE — 999N000065 XR SHOULDER 2 VIEW RIGHT: Mod: RT

## 2021-12-08 PROCEDURE — 23650 CLTX SHO DSLC W/MNPJ WO ANES: CPT | Mod: RT

## 2021-12-08 RX ORDER — OXYCODONE HYDROCHLORIDE 5 MG/1
5-10 TABLET ORAL EVERY 6 HOURS PRN
Qty: 12 TABLET | Refills: 0 | Status: SHIPPED | OUTPATIENT
Start: 2021-12-08

## 2021-12-08 RX ORDER — FENTANYL CITRATE 50 UG/ML
50 INJECTION, SOLUTION INTRAMUSCULAR; INTRAVENOUS ONCE
Status: COMPLETED | OUTPATIENT
Start: 2021-12-08 | End: 2021-12-08

## 2021-12-08 RX ORDER — PROPOFOL 10 MG/ML
25 INJECTION, EMULSION INTRAVENOUS
Status: DISCONTINUED | OUTPATIENT
Start: 2021-12-08 | End: 2021-12-08 | Stop reason: HOSPADM

## 2021-12-08 RX ORDER — PROPOFOL 10 MG/ML
50 INJECTION, EMULSION INTRAVENOUS
Status: DISCONTINUED | OUTPATIENT
Start: 2021-12-08 | End: 2021-12-08 | Stop reason: HOSPADM

## 2021-12-08 RX ORDER — OXYCODONE HYDROCHLORIDE 5 MG/1
5-10 TABLET ORAL EVERY 6 HOURS PRN
Qty: 12 TABLET | Refills: 0 | Status: SHIPPED | OUTPATIENT
Start: 2021-12-08 | End: 2021-12-08

## 2021-12-08 RX ORDER — ACETAMINOPHEN 325 MG/1
650 TABLET ORAL ONCE
Status: COMPLETED | OUTPATIENT
Start: 2021-12-08 | End: 2021-12-08

## 2021-12-08 RX ORDER — OXYCODONE HYDROCHLORIDE 5 MG/1
5 TABLET ORAL ONCE
Status: COMPLETED | OUTPATIENT
Start: 2021-12-08 | End: 2021-12-08

## 2021-12-08 RX ORDER — FENTANYL CITRATE 50 UG/ML
INJECTION, SOLUTION INTRAMUSCULAR; INTRAVENOUS
Status: DISCONTINUED
Start: 2021-12-08 | End: 2021-12-08 | Stop reason: HOSPADM

## 2021-12-08 RX ORDER — PROPOFOL 10 MG/ML
INJECTION, EMULSION INTRAVENOUS
Status: COMPLETED
Start: 2021-12-08 | End: 2021-12-08

## 2021-12-08 RX ADMIN — PROPOFOL 140 MG: 10 INJECTION, EMULSION INTRAVENOUS at 05:41

## 2021-12-08 RX ADMIN — OXYCODONE HYDROCHLORIDE 5 MG: 5 TABLET ORAL at 06:53

## 2021-12-08 RX ADMIN — ACETAMINOPHEN 650 MG: 325 TABLET, FILM COATED ORAL at 06:53

## 2021-12-08 RX ADMIN — FENTANYL CITRATE 50 MCG: 50 INJECTION INTRAMUSCULAR; INTRAVENOUS at 05:12

## 2021-12-08 ASSESSMENT — ENCOUNTER SYMPTOMS: ARTHRALGIAS: 1

## 2021-12-08 NOTE — ED TRIAGE NOTES
Here for right shoulder dislocation. Slept and turned wrong, and felt his shoulder pop out of place. Recent anterior right shoulder dislocation on 11/26. Ems gave Fentanyl 100mcg IV but not helping. ABCs intact.

## 2021-12-08 NOTE — ED PROVIDER NOTES
History   Chief Complaint:  Right shoulder pain     HPI   Jonny Spencer III is a 51 year old male who presents with right shoulder pain. The patient reports that tonight while sleeping, he moved his arm above his head and felt his right shoulder move. He has since had right shoulder pain and is concerned that he might have dislocated his shoulder again. The patient was evaluated in the emergency department on 11/26/2021 for right shoulder pain that began while playing pickle ball. He was found to have a right shoulder dislocation which was reduced in the emergency department. The patient did follow up with Dr. Hill of [facility] and has an MRI scheduled for next week. Today, the patient states that his pain is much worse than it was on 11/26. He was given 100 mcg of fentanyl by EMS, but did not have any improvement in his pain.     Review of Systems   Musculoskeletal: Positive for arthralgias (R shoulder).   All other systems reviewed and are negative.        Allergies:  Penicillins    Medications:  Tessalon  Norco  Reglan    Past Medical History:     Calculus of lower urinary tract  Influenza A  Recurrent maxillary sinusitis     Past Surgical History:    Hernia repair   Vasectomy     Family History:    The patient denies past family history.     Social History:  The patient presents to the emergency department alone.     Physical Exam     Patient Vitals for the past 24 hrs:   BP Temp Temp src Pulse Resp SpO2   12/08/21 0544 (!) 143/91 -- -- 67 15 99 %   12/08/21 0540 (!) 164/106 -- -- 70 12 98 %   12/08/21 0535 (!) 151/94 -- -- 71 21 96 %   12/08/21 0530 (!) 141/84 -- -- 77 18 96 %   12/08/21 0525 -- -- -- 74 12 99 %   12/08/21 0523 -- -- -- 75 16 98 %   12/08/21 0522 (!) 150/87 -- -- 71 12 99 %   12/08/21 0518 (!) 150/87 -- -- 72 13 98 %   12/08/21 0511 -- 98.8  F (37.1  C) Oral 76 22 99 %       Physical Exam    Gen: Uncomfortable secondary to pain  HEENT: Atraumatic, normocephalic  CV: ppi, regular   Resp: Clear to  auscultation  Extremity: Skeletal survey unremarkable with exception of the right upper extremity at the glenohumeral joint there is palpable deformity and empty socket.  Skin is intact.  Distally radial pulse 2 out of 4, hand warm, sensation intact all dermatomes,  strength 5 out of 5  Skin: warm dry well perfused  Neuro: Alert, no gross motor or sensory deficits,  gait stable          Emergency Department Course     Imaging:  XR Shoulder Right 2 Views   Final Result   IMPRESSION: Interval reduction of right shoulder. Hill-Sachs deformity noted. Early AC joint degenerative change.       XR Shoulder Right Port 1 View   Final Result   IMPRESSION:    1. Anterior dislocation of the right humeral head.   2. No visualized acute fracture of the right shoulder.              Reading per radiology.    [facility]    -Dislocation - Upper Extremity    Date/Time: 12/8/2021 5:14 AM  Performed by: Hiro Velasco MD  Authorized by: Hiro Velasco MD     Risks, benefits and alternatives discussed.    ED EVALUATION:      Assessment Time: 12/8/2021 5:14 AM      I have performed an Emergency Department Evaluation including taking a history and physical examination, this evaluation will be documented in the electronic medical record for this ED encounter.     Indication: joint reduction    ASA Class: Class 1- healthy patient    Mallampati: Grade 1- soft palate, uvula, tonsillar pillars, and posterior pharyngeal wall visible    NPO Status: appropriately NPO for procedure    UNIVERSAL PROTOCOL   Site Marked: NA  Prior Images Obtained and Reviewed:  Yes  Required items: Required blood products, implants, devices and special equipment available    Patient identity confirmed:  Verbally with patient and arm band  Patient was reevaluated immediately before administering moderate or deep sedation or anesthesia  Confirmation Checklist:  Patient's identity using two indicators, relevant allergies,  procedure was appropriate and matched the consent or emergent situation and correct equipment/implants were available  Universal Protocol: the Joint Commission Universal Protocol was followed      LOCATION     Location:  Shoulder    Shoulder location:  R shoulder    Shoulder dislocation type: anterior      Chronicity:  Recurrent    PRE PROCEDURE ASSESSMENT      Pre-procedure imaging:  X-ray    Imaging findings: dislocation present      Imaging findings: no fracture      Fracture of greater humeral tuberosity: no      Hill-Sachs deformity: yes      Distal perfusion: normal      SEDATION  Patient Sedated: Yes    Sedation Type:  Deep and moderate (conscious) sedation  Sedation:  Propofol  Vital signs: Vital signs monitored during sedation      ANESTHESIA (see MAR for exact dosages)      Anesthesia method:  None    PROCEDURE DETAILS      Manipulation performed: yes      Shoulder reduction method:  Traction and counter traction    Reduction successful: yes      Reduction confirmed with imaging: yes      Supplies used:  Prefabricated splint (shouolder immobilizer )    POST PROCEDURE DETAILS     Neurological function: normal      Distal perfusion: normal      Range of motion: improved        PROCEDURE    Patient Tolerance:  Patient tolerated the procedure well with no immediate complications  Length of time physician/provider present for 1:1 monitoring during sedation: 20        Emergency Department Course:  Reviewed:  I reviewed nursing notes, vitals, past medical history and Care Everywhere    Assessments:  0513 I obtained history and examined the patient as noted above.   0518 I rechecked the patient.   0524 I rechecked the patient and performed the dislocation reduction as above.     Interventions:  0512 Fentanyl 50 mcg IV   0541 Diprivan 140 mg IV     Disposition:  The patient was discharged to home.     Impression & Plan         Medical Decision Making:  Recurrent right shoulder dislocation.  Intact distal  neurovascular structures.  Suspect unfortunately rolled over in his sleep and abducted the arm.  It is significant of pain with the difficulty reducing in the absence of sedation and so he was given propofol for procedural sedation allowing him to relax and achieve joint reduction using a traction countertraction technique.  After the joint was relocated repeat neurovascular exam was intact.  Radiograph showing Hill-Sachs deformity which was also noted on his radiograph 1 week ago.  Will remain in a shoulder immobilizer follow-up with orthopedics.    Diagnosis:    ICD-10-CM    1. Shoulder dislocation, right, initial encounter  S43.004A        Discharge Medications:  Current Discharge Medication List      START taking these medications    Details   oxyCODONE (ROXICODONE) 5 MG tablet Take 1-2 tablets (5-10 mg) by mouth every 6 hours as needed for pain  Qty: 12 tablet, Refills: 0             Scribe Disclosure:  Kev SANTANA, am serving as a scribe at 5:08 AM on 12/8/2021 to document services personally performed by Hiro Velasco MD based on my observations and the provider's statements to me.              Hiro Velasco MD  12/08/21 0604

## 2022-04-30 ENCOUNTER — HEALTH MAINTENANCE LETTER (OUTPATIENT)
Age: 52
End: 2022-04-30

## 2022-11-20 ENCOUNTER — HEALTH MAINTENANCE LETTER (OUTPATIENT)
Age: 52
End: 2022-11-20

## 2023-05-18 ENCOUNTER — OFFICE VISIT (OUTPATIENT)
Dept: URGENT CARE | Facility: URGENT CARE | Age: 53
End: 2023-05-18
Payer: COMMERCIAL

## 2023-05-18 VITALS
DIASTOLIC BLOOD PRESSURE: 76 MMHG | BODY MASS INDEX: 28.25 KG/M2 | WEIGHT: 220 LBS | TEMPERATURE: 98.7 F | OXYGEN SATURATION: 96 % | HEART RATE: 82 BPM | RESPIRATION RATE: 14 BRPM | SYSTOLIC BLOOD PRESSURE: 118 MMHG

## 2023-05-18 DIAGNOSIS — R21 RASH: Primary | ICD-10-CM

## 2023-05-18 LAB
BASOPHILS # BLD AUTO: 0 10E3/UL (ref 0–0.2)
BASOPHILS NFR BLD AUTO: 1 %
EOSINOPHIL # BLD AUTO: 0.1 10E3/UL (ref 0–0.7)
EOSINOPHIL NFR BLD AUTO: 1 %
ERYTHROCYTE [DISTWIDTH] IN BLOOD BY AUTOMATED COUNT: 12.8 % (ref 10–15)
HCT VFR BLD AUTO: 43.8 % (ref 40–53)
HGB BLD-MCNC: 14.9 G/DL (ref 13.3–17.7)
IMM GRANULOCYTES # BLD: 0 10E3/UL
IMM GRANULOCYTES NFR BLD: 0 %
LYMPHOCYTES # BLD AUTO: 1.7 10E3/UL (ref 0.8–5.3)
LYMPHOCYTES NFR BLD AUTO: 26 %
MCH RBC QN AUTO: 30.7 PG (ref 26.5–33)
MCHC RBC AUTO-ENTMCNC: 34 G/DL (ref 31.5–36.5)
MCV RBC AUTO: 90 FL (ref 78–100)
MONOCYTES # BLD AUTO: 0.6 10E3/UL (ref 0–1.3)
MONOCYTES NFR BLD AUTO: 9 %
NEUTROPHILS # BLD AUTO: 4.2 10E3/UL (ref 1.6–8.3)
NEUTROPHILS NFR BLD AUTO: 63 %
PLATELET # BLD AUTO: 213 10E3/UL (ref 150–450)
RBC # BLD AUTO: 4.86 10E6/UL (ref 4.4–5.9)
WBC # BLD AUTO: 6.7 10E3/UL (ref 4–11)

## 2023-05-18 PROCEDURE — 85025 COMPLETE CBC W/AUTO DIFF WBC: CPT | Performed by: FAMILY MEDICINE

## 2023-05-18 PROCEDURE — 36415 COLL VENOUS BLD VENIPUNCTURE: CPT | Performed by: FAMILY MEDICINE

## 2023-05-18 PROCEDURE — 99203 OFFICE O/P NEW LOW 30 MIN: CPT | Performed by: FAMILY MEDICINE

## 2023-05-18 RX ORDER — MICONAZOLE NITRATE 20 MG/G
CREAM TOPICAL 2 TIMES DAILY
Qty: 42.5 G | Refills: 0 | Status: SHIPPED | OUTPATIENT
Start: 2023-05-18

## 2023-05-19 NOTE — PROGRESS NOTES
URGENT CARE    ASSESSMENT AND PLAN:      ICD-10-CM    1. Rash  R21 CBC with platelets and differential     CBC with platelets and differential     miconazole (MICATIN) 2 % external cream          Certainly with left armpit rash and scaliness presentation could be consistent with tinea rash and will treat with miconazole cream BID to trial.  Does not look infectious such as folliculitis, cellulitis, or HS. Patient requesting CBC today that was unremarkable.      Follow up with primary care provider with any problems, questions or concerns or if symptoms worsen or fail to improve. Patient verbalized understanding and is agreeable to plan. The patient was discharged ambulatory and in stable condition.    Chief Complaint   Patient presents with     Rash     Lef Underarm rash, lower arm and left abdomen x 3 days , redness, spreading   - MAY want labs     SUBJECTIVE:  Jonny Spencer III is a 53 year old male who presents to the clinic today for left arm pit rash noted 3 days ago that is irritating.  This afternoon when he flew back from a work trip he noticed similar rash on his left upper extremity.  Patient denies fever/chills, itchiness, difficulty breathing, throat/tongue swelling, new meds, pets, foods, soaps, detergents, lotions, or enviornmental contacts.    OTC: none    History reviewed. No pertinent past medical history.  benzonatate (TESSALON) 100 MG capsule, Take 1 capsule (100 mg) by mouth 3 times daily as needed for cough (Patient not taking: Reported on 5/18/2023)  HYDROcodone-acetaminophen (NORCO) 5-325 MG per tablet, Take 1-2 tablets by mouth every 6 hours as needed for pain maximum 10 tablet(s) per day (Patient not taking: Reported on 5/18/2023)  metoclopramide (REGLAN) 10 MG tablet, Take 1 tablet (10 mg) by mouth 3 times daily as needed (Nausea or Vomiting) (Patient not taking: Reported on 5/18/2023)  oxyCODONE (ROXICODONE) 5 MG tablet, Take 1-2 tablets (5-10 mg) by mouth every 6 hours as needed for pain  (Patient not taking: Reported on 5/18/2023)    No current facility-administered medications on file prior to visit.    Social History     Tobacco Use     Smoking status: Former     Smokeless tobacco: Not on file   Vaping Use     Vaping status: Never Used   Substance Use Topics     Alcohol use: Yes     Allergies   Allergen Reactions     Penicillins Rash     Has taken amoxicillin without problems  Has taken amoxicillin without problems         Review of Systems  All systems reviewed and negative except per HPI.    EXAM:   /76 (BP Location: Right arm, Patient Position: Chair, Cuff Size: Adult Regular)   Pulse 82   Temp 98.7  F (37.1  C) (Oral)   Resp 14   Wt 99.8 kg (220 lb)   SpO2 96%   BMI 28.25 kg/m      Physical Exam  GENERAL: healthy, alert and no distress  SKIN: please see photos for details.                   Results for orders placed or performed in visit on 05/18/23 (from the past 24 hour(s))   CBC with platelets and differential    Narrative    The following orders were created for panel order CBC with platelets and differential.  Procedure                               Abnormality         Status                     ---------                               -----------         ------                     CBC with platelets and d...[[id number]]                      Final result                 Please view results for these tests on the individual orders.   CBC with platelets and differential   Result Value Ref Range    WBC Count 6.7 4.0 - 11.0 10e3/uL    RBC Count 4.86 4.40 - 5.90 10e6/uL    Hemoglobin 14.9 13.3 - 17.7 g/dL    Hematocrit 43.8 40.0 - 53.0 %    MCV 90 78 - 100 fL    MCH 30.7 26.5 - 33.0 pg    MCHC 34.0 31.5 - 36.5 g/dL    RDW 12.8 10.0 - 15.0 %    Platelet Count 213 150 - 450 10e3/uL    % Neutrophils 63 %    % Lymphocytes 26 %    % Monocytes 9 %    % Eosinophils 1 %    % Basophils 1 %    % Immature Granulocytes 0 %    Absolute Neutrophils 4.2 1.6 - 8.3 10e3/uL    Absolute Lymphocytes 1.7  0.8 - 5.3 10e3/uL    Absolute Monocytes 0.6 0.0 - 1.3 10e3/uL    Absolute Eosinophils 0.1 0.0 - 0.7 10e3/uL    Absolute Basophils 0.0 0.0 - 0.2 10e3/uL    Absolute Immature Granulocytes 0.0 <=0.4 10e3/uL

## 2024-02-03 ENCOUNTER — HEALTH MAINTENANCE LETTER (OUTPATIENT)
Age: 54
End: 2024-02-03

## 2025-03-02 ENCOUNTER — HEALTH MAINTENANCE LETTER (OUTPATIENT)
Age: 55
End: 2025-03-02